# Patient Record
Sex: MALE | Race: WHITE | NOT HISPANIC OR LATINO | URBAN - METROPOLITAN AREA
[De-identification: names, ages, dates, MRNs, and addresses within clinical notes are randomized per-mention and may not be internally consistent; named-entity substitution may affect disease eponyms.]

---
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
## 2025-03-27 NOTE — PROCEDURE: TREATMENT REGIMEN
Plan: Labs ordered: CBC w/diff, fasting lipid, Comp. chem panel
Discontinue Regimen: any other acne tx
Detail Level: Zone
Initiate Treatment: Absorica 30 mg take one po qd, will send to [facility] [place] when lab results are received
patient